# Patient Record
Sex: FEMALE | Race: WHITE
[De-identification: names, ages, dates, MRNs, and addresses within clinical notes are randomized per-mention and may not be internally consistent; named-entity substitution may affect disease eponyms.]

---

## 2018-04-09 ENCOUNTER — HOSPITAL ENCOUNTER (OUTPATIENT)
Dept: HOSPITAL 80 - FIMAGING | Age: 53
End: 2018-04-09
Attending: INTERNAL MEDICINE
Payer: COMMERCIAL

## 2018-04-09 DIAGNOSIS — Z12.31: Primary | ICD-10-CM

## 2018-04-09 DIAGNOSIS — Z85.3: ICD-10-CM

## 2018-07-26 ENCOUNTER — HOSPITAL ENCOUNTER (EMERGENCY)
Dept: HOSPITAL 80 - FED | Age: 53
Discharge: HOME | End: 2018-07-26
Payer: COMMERCIAL

## 2018-07-26 VITALS — DIASTOLIC BLOOD PRESSURE: 87 MMHG | SYSTOLIC BLOOD PRESSURE: 129 MMHG
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
## 2018-07-26 DIAGNOSIS — S01.01XA: Primary | ICD-10-CM

## 2018-07-26 DIAGNOSIS — Z85.3: ICD-10-CM
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
## 2018-07-26 PROCEDURE — 0HQ0XZZ REPAIR SCALP SKIN, EXTERNAL APPROACH: ICD-10-PCS | Performed by: EMERGENCY MEDICINE

## 2018-07-26 NOTE — EDPHY
H & P


Time Seen by Provider: 18 18:26


HPI/ROS: 





HPI


Head injury.





52-year-old female by private vehicle with her friend.  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  She was playing a game with them when she was pushed over 

backwards onto a hard gravel surface.  She reports loss of consciousness for 

about a minute.  She complains now of a scalp laceration to the back of her 

head.  No nausea or vomiting.  She is not confused.  She has a posterior 

headache.  No neck pain.  No loss of sensation or weakness in her extremities.  

No other complaint.  She is not on any anticoagulation or antiplatelet agent.





ROS:





Constitutional:  No fever, no chills.  As above.


Eyes:  No discharge.  No changes in vision.


ENT:  No sore throat.  No nasal congestion or rhinorrhea.


Respiratory:  No cough.  No shortness of breath.


Cardiac:  No chest pain, no palpitations.


Gastrointestinal:  No abdominal pain, no vomiting, no diarrhea.


Genitourinary:  No hematuria.  No dysuria or increased frequency with urination.


Musculoskeletal:  No back pain.  No neck pain.  No myalgias or arthralgias.


Skin:  No rashes.  As above.


Neurological:  As above.  No focal weakness or altered sensation.





Past medical history:  Breast cancer.





Social history:  Nonsmoker.  No alcohol.  Here with her friend.





Physical Exam:





General Appearance:  Alert, no distress.  This patient is responding to 

questions appropriately and in full sentences.  This patient appears well-

hydrated and well-nourished.


Head:  Normocephalic atraumatic except for a 1.5 cm non gaping linear scalp 

laceration mid posterior parietal.  No bony step-off or deformity noted on 

palpation of this area..


Face:  Facial bones are stable on palpation.


Eyes:  Pupils equal and round and reactive to light, no pallor or injection.  

No lid erythema or edema.


ENT, Mouth:  Mucous membranes moist.  Dentition is intact.  No malocclusion of 

the jaw.  No tongue lacerations or abrasions.  Pharynx is clear.  The bilateral 

nasal canals are clear.  No septal hematoma.


Respiratory:  There are no retractions, lungs are clear to auscultation with 

good air movement bilaterally.  Chest wall is stable to AP and lateral 

palpation.


Cardiovascular:  Regular rate and rhythm.  No murmur.


Gastrointestinal:  Abdomen is soft and nontender, no masses, bowel sounds 

normal.


Neurological:  Motor sensory function is intact.  Cranial nerves are normal.  

Cerebellar function intact.


Skin:  Warm and dry, no rashes.  No lacerations, abrasions or contusions.


Musculoskeletal:  Neck is supple and nontender.  The trachea is midline.  No 

midline cervical, thoracic, lumbar or sacral tenderness on palpation.  No flank 

tenderness on palpation.


Extremities are symmetrical, full range of motion.  All joints in the bilateral 

upper and bilateral lower extremities range without pain or impingement.  No 

tenderness on palpation of the long bones in the bilateral upper and bilateral 

lower extremities.


Psychiatric:  No agitation.  No depression.





Database:





EKG:





Imaging:





CT head without contrast:  Negative study.  Results were discussed with staff 

radiologist.





Procedures:





Procedure:  Laceration repair.





Verbal consent was obtained from the patient.  The 1.5 cm non gaping laceration 

on the mid posterior parietal scalp was anesthetized in the usual fashion.  The 

wound was irrigated, draped and explored to its base with a gloved finger.  

There were no deep structures involved.  No tendon injury was identified.  The 

wound was repaired with 2 percutaneous surgical staples.  The wound repair was 

tolerated well and there were no complications.  The procedure was performed by 

myself.





Emergency department course:





Triage vital signs reviewed and are normal.  Patient consents for CT imaging of 

her head.  Wound care as above.





7:45 p.m., patient re-evaluated.  Resting comfortably as above.  Results of her 

CT scan were discussed with her and her friend.  Repeat neurologic Assessment 

is nonfocal.  She is alert and oriented.  She is communicating appropriately 

and in full sentences.  She feels comfortable going home and I feel she is safe 

for discharge.  Wound care was discussed with her.  Head injury precautions 

reviewed.  Return to emergency department precautions discussed.  All of her 

questions were answered.  She was discharged from the emergency department in 

good condition with her friend.





Differential Diagnosis:





The differential diagnosis on this patient includes but is not limited to scalp 

laceration, head injury.  Traumatic brain injury, skull fracture, cervical 

spine fracture, other significant traumatic injury unlikely.  This represents a 

partial list of diagnoses considered.  These considerations are based on history

, physical exam, past history, reassessment and diagnostic testing.


Smoking Status: Never smoked


Constitutional: 


 Initial Vital Signs











Temperature (C)  36.9 C   18 17:18


 


Heart Rate  60   18 17:18


 


Respiratory Rate  18   07/26/18 17:18


 


Blood Pressure  134/84 H  18 17:18


 


O2 Sat (%)  98   18 17:18








 











O2 Delivery Mode               Room Air














Allergies/Adverse Reactions: 


 





VICRYL SUTURE Allergy (Uncoded 18 17:18)


 








Home Medications: 














 Medication  Instructions  Recorded


 


NK [No Known Home Meds]  18














Medical Decision Making





- Diagnostics


Imaging Results: 


 Imaging Impressions





Head CT  18 18:46


Impression: 


1. No acute intracranial process.


2. Mild paranasal sinus disease.


 


Findings and recommendations discussed with Laughlin B McCollester, MD  at 1920 

hour, 2018.














Departure





- Departure


Disposition: Home, Routine, Self-Care


Clinical Impression: 


 Scalp laceration, Head injury





Condition: Good


Instructions:  Head Injury (ED)


Additional Instructions: 


Read and follow provided instructions.





Follow-up with your primary care physician or workman's Comp physician in 2-3 

days for re-evaluation.





Staples should be removed from her scalp in 7-10 days.





Ibuprofen dosin mg every 6 hours with meals for the next 3 days only.  

Take only as needed for pain.





Return to the emergency department for worsening headache, neck pain, vomiting, 

confusion or other serious concerns.


Referrals: 


Mayra Del Cid MD [Primary Care Provider] - As per Instructions